# Patient Record
Sex: MALE | Race: WHITE | HISPANIC OR LATINO | Employment: FULL TIME | URBAN - METROPOLITAN AREA
[De-identification: names, ages, dates, MRNs, and addresses within clinical notes are randomized per-mention and may not be internally consistent; named-entity substitution may affect disease eponyms.]

---

## 2018-11-07 ENCOUNTER — OFFICE VISIT (OUTPATIENT)
Dept: URGENT CARE | Facility: CLINIC | Age: 53
End: 2018-11-07
Payer: MEDICAID

## 2018-11-07 VITALS
TEMPERATURE: 98 F | BODY MASS INDEX: 29.82 KG/M2 | HEIGHT: 67 IN | OXYGEN SATURATION: 98 % | HEART RATE: 74 BPM | WEIGHT: 190 LBS

## 2018-11-07 DIAGNOSIS — S05.01XA INJURY OF CONJUNCTIVA AND CORNEAL ABRASION WITHOUT FOREIGN BODY, RIGHT EYE, INITIAL ENCOUNTER: Primary | ICD-10-CM

## 2018-11-07 PROCEDURE — 99203 OFFICE O/P NEW LOW 30 MIN: CPT | Mod: S$GLB,,, | Performed by: NURSE PRACTITIONER

## 2018-11-07 RX ORDER — NEOMYCIN SULFATE, POLYMYXIN B SULFATE, AND DEXAMETHASONE 3.5; 10000; 1 MG/G; [USP'U]/G; MG/G
OINTMENT OPHTHALMIC 3 TIMES DAILY
Qty: 1 TUBE | Refills: 0 | Status: SHIPPED | OUTPATIENT
Start: 2018-11-07

## 2018-11-07 NOTE — PATIENT INSTRUCTIONS
Corneal Abrasion    You have received a scratch or scrape (abrasion) to your cornea. The cornea is the clear part in the front of the eye. This sensitive area is very painful when injured. You may make tears frequently, and your vision may be blurry until the injury heals. You may be sensitive to light.  This part of the body heals quickly. You can expect the pain to go away within 24 to 48 hours. If the abrasion is large or deep, your doctor may apply an eye patch, although this is not always done. An antibiotic ointment or eye drops may also be used to prevent infection.  Numbing drops may be used to relieve the pain temporarily so that your eyes can be examined. However, these drops cannot be prescribed for home use because that would prevent healing and lead to more serious problems. Also, if you cant feel your eye, there is a chance of accidentally injuring it further without knowing it.  Home care  · A cold pack (ice in a plastic bag, wrapped in a towel) may be applied over the eye (or eye patch) for 20 minutes at a time, to reduce pain.  · You may use acetaminophen or ibuprofen to control pain, unless another pain medicine was prescribed. Note: If you have chronic liver or kidney disease or ever had a stomach ulcer or GI bleeding, talk with your doctor before using these medicines.  · Rest your eyes and do not read until symptoms are gone.  · If you use contact lenses, do not wear them until all symptoms are gone.  · If your vision is affected by the corneal abrasion or if an eye patch was applied, do not drive a motor vehicle or operate machinery until all symptoms are gone. You may have trouble judging distances using only one eye.  · If your eyes are sensitive to light, try wearing sunglasses, or stay indoors until symptoms go away.  Follow-up care  Follow up with your health care provider, or as advised.  · If no patch was put on your eye, and used but the pain continues for more than 48 hours, you  should have another exam. Return to this facility or contact your health care provider to arrange this.  · If your eye was patched and you were asked to remove the patch yourself, see your health care provider. You may also return to this facility if you still have pain after the patch is removed.  · If you were given a return appointment for patch removal and re-examination, be sure to keep the appointment. Leaving the patch in place longer than advised could be harmful.  When to seek medical advice  Call your health care provider right away if any of these occur.  · Increasing eye pain or pain that does not improve after 24 hours  · Discharge from the eye  · Increasing redness of the eye or swelling of the eyelids  · Worsening vision  · Symptoms that worsen after the abrasion has healed  Date Last Reviewed: 6/14/2015  © 2449-7654 [employer]. [address] [place] [address]. All rights reserved. This information is not intended as a substitute for professional medical care. Always follow your healthcare professional's instructions.    Please return here or go to the Emergency Department for any concerns or worsening of condition.  If you were prescribed antibiotics, please take them to completion.  If you were prescribed a narcotic medication, do not drive or operate heavy equipment or machinery while taking these medications.  Please follow up with your primary care doctor or specialist as needed.    If you  smoke, please stop smoking.

## 2018-11-07 NOTE — PROGRESS NOTES
"Subjective:       Patient ID: Killian Vergara is a 53 y.o. female.    Vitals:  height is 5' 7" (1.702 m) and weight is 86.2 kg (190 lb). Her temperature is 98.2 °F (36.8 °C). Her pulse is 74. Her oxygen saturation is 98%.     Chief Complaint: Eye Problem    Pt reports for 6 days having both eyes  itchy, watery , has been using otc eye drops      Eye Problem    Both eyes are affected.This is a new problem. The current episode started in the past 7 days. The problem occurs constantly. The injury mechanism is unknown. There is no known exposure to pink eye. She does not wear contacts. Associated symptoms include blurred vision and eye redness. Pertinent negatives include no fever, nausea, photophobia or vomiting. She has tried eye drops for the symptoms. The treatment provided no relief.     Review of Systems   Constitution: Negative for chills and fever.   HENT: Negative for congestion.    Eyes: Positive for blurred vision, pain and redness. Negative for photophobia.   Gastrointestinal: Negative for nausea and vomiting.   Neurological: Negative for headaches.       Objective:      Physical Exam   Constitutional: She is oriented to person, place, and time. She appears well-developed and well-nourished. She is cooperative.  Non-toxic appearance. She does not appear ill. No distress.   HENT:   Head: Normocephalic and atraumatic.   Right Ear: Hearing, tympanic membrane, external ear and ear canal normal.   Left Ear: Hearing, tympanic membrane, external ear and ear canal normal.   Nose: Nose normal. No mucosal edema, rhinorrhea or nasal deformity. No epistaxis. Right sinus exhibits no maxillary sinus tenderness and no frontal sinus tenderness. Left sinus exhibits no maxillary sinus tenderness and no frontal sinus tenderness.   Mouth/Throat: Uvula is midline, oropharynx is clear and moist and mucous membranes are normal. No trismus in the jaw. Normal dentition. No uvula swelling. No posterior oropharyngeal erythema.   Eyes: " Conjunctivae and lids are normal. Right eye exhibits no discharge. Left eye exhibits no discharge. No scleral icterus.   Slit lamp exam:       The right eye shows corneal abrasion and fluorescein uptake.   Sclera red on right side. Abrasion noted to the right eye using woods lamp.    Neck: Trachea normal, normal range of motion, full passive range of motion without pain and phonation normal. Neck supple.   Cardiovascular: Normal rate, regular rhythm, normal heart sounds, intact distal pulses and normal pulses.   Pulmonary/Chest: Effort normal and breath sounds normal. No respiratory distress.   Abdominal: Soft. Normal appearance and bowel sounds are normal. She exhibits no distension, no pulsatile midline mass and no mass. There is no tenderness.   Musculoskeletal: Normal range of motion. She exhibits no edema or deformity.   Neurological: She is alert and oriented to person, place, and time. She exhibits normal muscle tone. Coordination normal.   Skin: Skin is warm, dry and intact. She is not diaphoretic. No pallor.   Psychiatric: She has a normal mood and affect. Her speech is normal and behavior is normal. Judgment and thought content normal. Cognition and memory are normal.   Nursing note and vitals reviewed.      Assessment:       1. Injury of conjunctiva and corneal abrasion without foreign body, right eye, initial encounter        Plan:       Patient Instructions     Corneal Abrasion    You have received a scratch or scrape (abrasion) to your cornea. The cornea is the clear part in the front of the eye. This sensitive area is very painful when injured. You may make tears frequently, and your vision may be blurry until the injury heals. You may be sensitive to light.  This part of the body heals quickly. You can expect the pain to go away within 24 to 48 hours. If the abrasion is large or deep, your doctor may apply an eye patch, although this is not always done. An antibiotic ointment or eye drops may also be  used to prevent infection.  Numbing drops may be used to relieve the pain temporarily so that your eyes can be examined. However, these drops cannot be prescribed for home use because that would prevent healing and lead to more serious problems. Also, if you cant feel your eye, there is a chance of accidentally injuring it further without knowing it.  Home care  · A cold pack (ice in a plastic bag, wrapped in a towel) may be applied over the eye (or eye patch) for 20 minutes at a time, to reduce pain.  · You may use acetaminophen or ibuprofen to control pain, unless another pain medicine was prescribed. Note: If you have chronic liver or kidney disease or ever had a stomach ulcer or GI bleeding, talk with your doctor before using these medicines.  · Rest your eyes and do not read until symptoms are gone.  · If you use contact lenses, do not wear them until all symptoms are gone.  · If your vision is affected by the corneal abrasion or if an eye patch was applied, do not drive a motor vehicle or operate machinery until all symptoms are gone. You may have trouble judging distances using only one eye.  · If your eyes are sensitive to light, try wearing sunglasses, or stay indoors until symptoms go away.  Follow-up care  Follow up with your health care provider, or as advised.  · If no patch was put on your eye, and used but the pain continues for more than 48 hours, you should have another exam. Return to this facility or contact your health care provider to arrange this.  · If your eye was patched and you were asked to remove the patch yourself, see your health care provider. You may also return to this facility if you still have pain after the patch is removed.  · If you were given a return appointment for patch removal and re-examination, be sure to keep the appointment. Leaving the patch in place longer than advised could be harmful.  When to seek medical advice  Call your health care provider right away if any of  these occur.  · Increasing eye pain or pain that does not improve after 24 hours  · Discharge from the eye  · Increasing redness of the eye or swelling of the eyelids  · Worsening vision  · Symptoms that worsen after the abrasion has healed  Date Last Reviewed: 6/14/2015  © 7261-7407 [employer]. [address] [place] [address]. All rights reserved. This information is not intended as a substitute for professional medical care. Always follow your healthcare professional's instructions.    Please return here or go to the Emergency Department for any concerns or worsening of condition.  If you were prescribed antibiotics, please take them to completion.  If you were prescribed a narcotic medication, do not drive or operate heavy equipment or machinery while taking these medications.  Please follow up with your primary care doctor or specialist as needed.    If you  smoke, please stop smoking.        Injury of conjunctiva and corneal abrasion without foreign body, right eye, initial encounter  -     neomycin-polymyxin-dexamethasone (DEXACINE) 3.5 mg/g-10,000 unit/g-0.1 % Oint; Place into the right eye 3 (three) times daily.  Dispense: 1 Tube; Refill: 0     Patient educated if his eye does not get better within the next 24 or 48 hr to follow up with Ophthalmology.

## 2019-03-07 ENCOUNTER — OFFICE VISIT (OUTPATIENT)
Dept: URGENT CARE | Facility: CLINIC | Age: 54
End: 2019-03-07
Payer: MEDICAID

## 2019-03-07 VITALS
WEIGHT: 190 LBS | OXYGEN SATURATION: 98 % | HEIGHT: 67 IN | SYSTOLIC BLOOD PRESSURE: 140 MMHG | HEART RATE: 80 BPM | BODY MASS INDEX: 29.82 KG/M2 | TEMPERATURE: 98 F | DIASTOLIC BLOOD PRESSURE: 90 MMHG

## 2019-03-07 DIAGNOSIS — R10.12 LEFT UPPER QUADRANT PAIN: Primary | ICD-10-CM

## 2019-03-07 PROCEDURE — 74019 RADEX ABDOMEN 2 VIEWS: CPT | Mod: S$GLB,,, | Performed by: RADIOLOGY

## 2019-03-07 PROCEDURE — 99214 OFFICE O/P EST MOD 30 MIN: CPT | Mod: S$GLB,,, | Performed by: NURSE PRACTITIONER

## 2019-03-07 PROCEDURE — 74019 XR ABDOMEN FLAT AND ERECT: ICD-10-PCS | Mod: S$GLB,,, | Performed by: RADIOLOGY

## 2019-03-07 PROCEDURE — 99214 PR OFFICE/OUTPT VISIT, EST, LEVL IV, 30-39 MIN: ICD-10-PCS | Mod: S$GLB,,, | Performed by: NURSE PRACTITIONER

## 2019-03-07 NOTE — PROGRESS NOTES
"Subjective:       Patient ID: Killian Vergara is a 53 y.o. male.    Vitals:  height is 5' 7" (1.702 m) and weight is 86.2 kg (190 lb). His temperature is 98.3 °F (36.8 °C). His blood pressure is 140/90 (abnormal) and his pulse is 80. His oxygen saturation is 98%.     Chief Complaint: Abdominal Pain    Pt reports having abdominal bloating and distention with pain, he states he has been having abdominal pain and an increase In passing gas  for 3 days. Last bowel movement was yesterday, patient reports that it was regular.         Abdominal Pain   This is a new problem. The current episode started in the past 7 days. The onset quality is sudden. The problem occurs constantly. The pain is at a severity of 9/10. The pain is mild. The abdominal pain does not radiate. Pertinent negatives include no constipation, diarrhea, dysuria, fever, nausea or vomiting. She has tried nothing for the symptoms. There is no history of abdominal surgery.       Constitution: Negative for appetite change, chills, sweating and fever.   HENT: Negative for trouble swallowing.    Cardiovascular: Negative for chest pain.   Respiratory: Negative for shortness of breath.    Gastrointestinal: Positive for abdominal pain and abdominal bloating. Negative for abdominal trauma, history of abdominal surgery, nausea, vomiting, constipation, diarrhea, dark colored stools and heartburn.   Genitourinary: Negative for dysuria, missed menses and pelvic pain.   Musculoskeletal: Negative for back pain.       Objective:      Physical Exam   Constitutional: She is oriented to person, place, and time. She appears well-developed and well-nourished. She is cooperative.  Non-toxic appearance. She does not appear ill. No distress.   HENT:   Head: Normocephalic and atraumatic.   Right Ear: Hearing, tympanic membrane, external ear and ear canal normal.   Left Ear: Hearing, tympanic membrane, external ear and ear canal normal.   Nose: Nose normal. No mucosal edema, " rhinorrhea or nasal deformity. No epistaxis. Right sinus exhibits no maxillary sinus tenderness and no frontal sinus tenderness. Left sinus exhibits no maxillary sinus tenderness and no frontal sinus tenderness.   Mouth/Throat: Uvula is midline, oropharynx is clear and moist and mucous membranes are normal. No trismus in the jaw. Normal dentition. No uvula swelling. No posterior oropharyngeal erythema.   Eyes: Conjunctivae and lids are normal. Right eye exhibits no discharge. Left eye exhibits no discharge. No scleral icterus.   Sclera clear bilat   Neck: Trachea normal, normal range of motion, full passive range of motion without pain and phonation normal. Neck supple.   Cardiovascular: Normal rate, regular rhythm, normal heart sounds, intact distal pulses and normal pulses.   Pulmonary/Chest: Effort normal and breath sounds normal. No respiratory distress.   Abdominal: Soft. Normal appearance and bowel sounds are normal. She exhibits no distension, no pulsatile midline mass and no mass. There is tenderness in the left upper quadrant. There is no rigidity, no rebound, no guarding, no CVA tenderness, no tenderness at McBurney's point and negative Hanson's sign.       Musculoskeletal: Normal range of motion. She exhibits no edema or deformity.   Neurological: She is alert and oriented to person, place, and time. She exhibits normal muscle tone. Coordination normal.   Skin: Skin is warm, dry and intact. She is not diaphoretic. No pallor.   Psychiatric: She has a normal mood and affect. Her speech is normal and behavior is normal. Judgment and thought content normal. Cognition and memory are normal.   Nursing note and vitals reviewed.      X-ray Abdomen Flat And Erect    Result Date: 3/7/2019  EXAMINATION: XR ABDOMEN FLAT AND ERECT CLINICAL HISTORY: Left upper quadrant pain TECHNIQUE: Flat and erect AP views of the abdomen were performed. COMPARISON: None FINDINGS: No significant small bowel dilatation.  Moderate fecal  burden.  Surgical clips right upper quadrant.  No abnormal calcifications.  No free gas.     Suspected constipation. Electronically signed by: Dc Alston MD Date:    03/07/2019 Time:    10:23  Assessment:       1. Left upper quadrant pain        Plan:         Left upper quadrant pain  -     X-Ray Abdomen Flat And Erect; Future; Expected date: 03/07/2019      Patient Instructions     Patient to increase fiber in diet and lower consumption of high fat foods.  Patient to start drinking lots of fluids. Patient can take gas x for gas and miralax as needed for constipation.   If symptoms persist or worsen patient needs to follow up with primary or go to the emergency room for CT scan.    Dolor Abdominal    El dolor abdominal es dolor en el vientre o en la lee del intestino. Todo el bismark tiene juliet tipo de dolor de vez en cuando. En muchos casos el dolor desaparece por sí solo. Lionel en ciertas ocasiones el dolor abdominal puede ser consecuencia de un problema grave, dinah por ejemplo liliya apendicitis. Por lo tanto, es importante saber cuándo se debe obtener ayuda.  Causas del dolor abdominal  El dolor abdominal puede tener muchas causas. Entre las causas más comunes, en los adultos, se encuentran las siguientes:   · Estreñimiento, diarrea o gases  · Reflujo gastroesofágico (desplazamiento del ácido estomacal hacia el esófago, también llamado reflujo ácido o ardor estomacal)  · Úlcera péptica (lesión en el revestimiento interno del estómago o del intestino urbina)  · Inflamación de la vesícula biliar, el hígado o el páncreas  · Cálculos biliares o renales  · Apendicitis  · Obstrucción del intestino  · Hernia (protrusión o abultamiento de un órgano interno a través de un músculo u otro tejido)  · Infecciones de las vías urinarias  · En las mujeres, cólicos menstruales, fibromas o endometriosis del útero  · Inflamación o infección del intestino  Diagnóstico de la causa del dolor abdominal  Celaya proveedor de atención  médica le hará un examen para ayudar a determinar la causa de albarran dolor. En seda necesario, le harán ciertas pruebas. El dolor abdominal puede ser difícil de diagnosticar porque jarrett causas pueden ser muy diversas. Los detalles que usted sepa kimberlyn acerca de albarran dolor pueden resultar útiles. Diga a albarran proveedor de atención médica dónde y cuándo siente el dolor y qué cosas lo alivian o lo empeoran. Mencione también si tiene otros síntomas dinah fiebre, cansancio, náuseas, vómito o cambios en la frecuencia con que evacúa jarrett intestinos o albarran vejiga.  Tratamiento del dolor abdominal  Ciertas causas de juliet dolor, dinah liliya apendicitis o liliya obstrucción intestinal, requieren tratamiento urgente. Otros problemas pueden tratarse mediante descanso, consumo de líquidos o medicamentos. Albarran proveedor de atención médica le dará instrucciones específicas para el tratamiento que debe seguir o cómo debe cuidarse según la causa de albarran dolor.   Si tiene vómito o diarrea, vannessa pequeños sorbos de agua u otros líquidos danni. Cuando esté listo para comer de nuevo alimentos sólidos, empiece comiendo pequeñas cantidades de cosas fáciles de digerir, dinah puré de manzanas, pan nacho o galletas saladas.   Cuándo debe llamar al médico  Llame al 911 o vaya al hospital de inmediato si tiene alguno de los siguientes síntomas:  · No puede defecar y está vomitando  · Está vomitando chaparro o tiene diarrea de color negruzco o muy oscuro  · Tiene también dolor en el pecho, en el ted o en el hombro  · Siente que está a punto de desmayarse  · Tiene dolor en los omóplatos, con náuseas  · Tiene un dolor repentino e insoportable en el abdomen  · Tiene un nuevo dolor distinto de todos los ingrid que ha tenido antes  · Tiene el vientre rígido, yaritza y sensible al tacto  Llame a albarran médico si tiene:  · Dolor paul más de 5 días  · Abotargamiento (sensación de llenura e inflamiento) paul más de 2 días  · Diarrea paul más de 5 días  · Fiebre superior  a 101°F o más  · Dolor que continúa aumentando  · Pérdida de peso sin motivo aparente  · Falta de apetito persistente  · Chaparro en las heces  Cómo prevenir el dolor abdominal  Estos son algunos consejos para ayudar a prevenir el dolor abdominal:  · Coma cantidades más pequeñas de alimentos en cada comida.  · Evite los alimentos fritos o que contengan mucha grasa.  · Evite los alimentos que le producen gas.  · Jaya ejercicio con regularidad.  · Marianne abundantes líquidos.  Para ayudar a prevenir los síntomas del reflujo gastroesofágico:  · Deje de fumar.  · Marianne menos alcohol y coma menos de esos alimentos que aumentan albarran acidez estomacal.  · Pierda el exceso de peso.  · Termine de comer dinah mínimo 2 horas antes de acostarse.  · Eleve un poco la cabecera de albarran cama.  Date Last Reviewed: 3/30/2014  © 0614-4632 [employer]. [address] [place] [address]. Todos los derechos reservados. Esta información no pretende sustituir la atención médica profesional. Sólo albarran médico puede diagnosticar y tratar un problema de elicia.        Dieta Karine En Fibra [High Fiber Diet]  La fibra esta presente en todas las frutas, verduras, cereales y granos. Es la porción de los alimentos que pasa por el cuerpo sin ser digerido. Gita dieta karine en fibra ayuda a que los alimentos pasen por las vías intestinales. El volumen adicional le ayudará a evitar estreñimiento y los casos de diverticulosis, limpiando las bolsas en la pared del colon y evitando que otras se lianet. Gita dieta karine en fibra también reduce el riesgo del cáncer del colon, reduce el colesterol en la chaparro y alise el azúcar alto de la chaparro en diabéticos después de comer.    Los alimentos en la lista que sigue son altos en fibra y deben ser incluidos en albarran dieta. Si usted no esta acostumbrado a los alimentos altos en fibra, comienza con 1 o 2 de los alimentos en esta lista. De cada 3 a 4 días, añade un alimento nuevo a albarran dieta hasta que usted está  comiendo 4 alimentos altos en fibra al día. Galion le dará de 20 a 35 gramos de fibra al día. También es importante que, mientras esté usando esta dieta, konstantin bastante agua (de 6 a 8 vasos por día). El agua hace que la fibra se hincha y aumenta el beneficio.  Alimentos Altos En Fibra Dietética:  PAN: Hechos con harina 100% de beverley entero, galletas de James, de beverley o luna, tortillas, panes de salvado (afrecho).  CEREALES: Granos enteros con salvado (Chex, Raisin Bran, Corn Bran), liliane entera, liliane machucada, granola, hojuelas de beverley, arroz no procesado.  NUECES: Cualquier clase de nueces.  FRUTAS: Todas las frutas frescas con jarrett cáscaras comestibles (que se pueden comer) (bananos, frutas cítricas, mangos, peras, ciruelas, pasas, manzanas, amor, albaricoques, melón, jaleas y mermeladas) jugo de frutas [especialmente jugo de ciruela (Prune)].  VERDURAS: Todas clase de verduras, aún mejor si están crudas o poco cocidas [especialmente apio, berenjena, baldomero, espinacas, bróculi, coles de Bruselas, calabaza del invierno, zanahorias, coliflor, soya, lentejas, fríjoles secos y frescos de toda clase].  OTROS: Palomitas de maíz, cualquier condimento.  Si Usted Tiene Diverticulosis, Sandee Estos Alimentos:  Semillas pequeñas pueden ser atrapadas en las bolsas del colon y causar un ataque de diverticulitis. Entonces debe evitar los condimentos que tienen semillas pequeñas (eneldo, apio), frutas con semillas pequeñas (fresas, arándano, frambuesas, zarzamoras, pasas, uvas con semillas), las palomitas de maíz. Si puede evitar tragar las semillas, puede comer frutas con semillas más grandes (dinah la sandía y frutas cítricas).  Date Last Reviewed: 11/19/2013  © 9310-4952 "[employer] [address] [place] [address]. Todos los derechos reservados. Esta información no pretende sustituir la atención médica profesional. Sólo albarran médico puede diagnosticar y tratar un problema de elicia.

## 2019-03-07 NOTE — PATIENT INSTRUCTIONS
Patient to increase fiber in diet and lower consumption of high fat foods.  Patient to start drinking lots of fluids. Patient can take gas x for gas and miralax as needed for constipation.   If symptoms persist or worsen patient needs to follow up with primary or go to the emergency room for CT scan.    Dolor Abdominal    El dolor abdominal es dolor en el vientre o en la lee del intestino. Todo el bismark tiene juliet tipo de dolor de vez en cuando. En muchos casos el dolor desaparece por sí solo. Lionel en ciertas ocasiones el dolor abdominal puede ser consecuencia de un problema grave, dinah por ejemplo liliya apendicitis. Por lo tanto, es importante saber cuándo se debe obtener ayuda.  Causas del dolor abdominal  El dolor abdominal puede tener muchas causas. Entre las causas más comunes, en los adultos, se encuentran las siguientes:   · Estreñimiento, diarrea o gases  · Reflujo gastroesofágico (desplazamiento del ácido estomacal hacia el esófago, también llamado reflujo ácido o ardor estomacal)  · Úlcera péptica (lesión en el revestimiento interno del estómago o del intestino urbina)  · Inflamación de la vesícula biliar, el hígado o el páncreas  · Cálculos biliares o renales  · Apendicitis  · Obstrucción del intestino  · Hernia (protrusión o abultamiento de un órgano interno a través de un músculo u otro tejido)  · Infecciones de las vías urinarias  · En las mujeres, cólicos menstruales, fibromas o endometriosis del útero  · Inflamación o infección del intestino  Diagnóstico de la causa del dolor abdominal  Albarran proveedor de atención médica le hará un examen para ayudar a determinar la causa de albarran dolor. En seda necesario, le harán ciertas pruebas. El dolor abdominal puede ser difícil de diagnosticar porque jarrett causas pueden ser muy diversas. Los detalles que usted sepa kimberlyn acerca de albarran dolor pueden resultar útiles. Diga a albarran proveedor de atención médica dónde y cuándo siente el dolor y qué cosas lo alivian o lo  empeoran. Mencione también si tiene otros síntomas dinah fiebre, cansancio, náuseas, vómito o cambios en la frecuencia con que evacúa jarrett intestinos o albarran vejiga.  Tratamiento del dolor abdominal  Ciertas causas de juliet dolor, dinah liliya apendicitis o liliya obstrucción intestinal, requieren tratamiento urgente. Otros problemas pueden tratarse mediante descanso, consumo de líquidos o medicamentos. Albarran proveedor de atención médica le dará instrucciones específicas para el tratamiento que debe seguir o cómo debe cuidarse según la causa de albarran dolor.   Si tiene vómito o diarrea, vannessa pequeños sorbos de agua u otros líquidos danni. Cuando esté listo para comer de nuevo alimentos sólidos, empiece comiendo pequeñas cantidades de cosas fáciles de digerir, dinah puré de manzanas, pan nacho o galletas saladas.   Cuándo debe llamar al médico  Llame al 911 o vaya al [facility] de inmediato si tiene alguno de los siguientes síntomas:  · No puede defecar y está vomitando  · Está vomitando chaparro o tiene diarrea de color negruzco o muy oscuro  · Tiene también dolor en el pecho, en el ted o en el hombro  · Siente que está a punto de desmayarse  · Tiene dolor en los omóplatos, con náuseas  · Tiene un dolor repentino e insoportable en el abdomen  · Tiene un nuevo dolor distinto de todos los ingrid que ha tenido antes  · Tiene el vientre rígido, yaritza y sensible al tacto  Llame a albarran médico si tiene:  · Dolor paul más de 5 días  · Abotargamiento (sensación de llenura e inflamiento) paul más de 2 días  · Diarrea paul más de 5 días  · Fiebre superior a 101°F o más  · Dolor que continúa aumentando  · Pérdida de peso sin motivo aparente  · Falta de apetito persistente  · Chaparro en las heces  Cómo prevenir el dolor abdominal  Estos son algunos consejos para ayudar a prevenir el dolor abdominal:  · Coma cantidades más pequeñas de alimentos en cada comida.  · Evite los alimentos fritos o que contengan mucha grasa.  · Evite los alimentos  que le producen gas.  · Jaya ejercicio con regularidad.  · Marianne abundantes líquidos.  Para ayudar a prevenir los síntomas del reflujo gastroesofágico:  · Deje de fumar.  · Marianne menos alcohol y coma menos de esos alimentos que aumentan albarran acidez estomacal.  · Pierda el exceso de peso.  · Termine de comer dinah mínimo 2 horas antes de acostarse.  · Eleve un poco la cabecera de albarran cama.  Date Last Reviewed: 3/30/2014  © 7521-3104 [employer] [address] [place] [id number]. Todos los derechos reservados. Esta información no pretende sustituir la atención médica profesional. Sólo albararn médico puede diagnosticar y tratar un problema de elicia.        Dieta Karine En Fibra [High Fiber Diet]  La fibra esta presente en todas las frutas, verduras, cereales y granos. Es la porción de los alimentos que pasa por el cuerpo sin ser digerido. Gita dieta karine en fibra ayuda a que los alimentos pasen por las vías intestinales. El volumen adicional le ayudará a evitar estreñimiento y los casos de diverticulosis, limpiando las bolsas en la pared del colon y evitando que otras se lianet. Gita dieta karine en fibra también reduce el riesgo del cáncer del colon, reduce el colesterol en la chaparro y alise el azúcar alto de la chaparro en diabéticos después de comer.    Los alimentos en la lista que sigue son altos en fibra y deben ser incluidos en albarran dieta. Si usted no esta acostumbrado a los alimentos altos en fibra, comienza con 1 o 2 de los alimentos en esta lista. De cada 3 a 4 días, añade un alimento nuevo a albarran dieta hasta que usted está comiendo 4 alimentos altos en fibra al día. [place] le dará de 20 a 35 gramos de fibra al día. También es importante que, mientras esté usando esta dieta, konstantin bastante agua (de 6 a 8 vasos por día). El agua hace que la fibra se hincha y aumenta el beneficio.  Alimentos Altos En Fibra Dietética:  PAN: Hechos con harina 100% de beverley entero, galletas de James, de beverley o luna, tortillas,  panes de salvado (afrecho).  CEREALES: Granos enteros con salvado (Chex, Raisin Bran, Corn Bran), liliane entera, liliane machucada, granola, hojuelas de beverley, arroz no procesado.  NUECES: Cualquier clase de nueces.  FRUTAS: Todas las frutas frescas con jarrett cáscaras comestibles (que se pueden comer) (bananos, frutas cítricas, mangos, peras, ciruelas, pasas, manzanas, amor, albaricoques, melón, jaleas y mermeladas) jugo de frutas [especialmente jugo de ciruela (Prune)].  VERDURAS: Todas clase de verduras, aún mejor si están crudas o poco cocidas [especialmente apio, berenjena, baldomero, espinacas, bróculi, coles de Bruselas, calabaza del invierno, zanahorias, coliflor, soya, lentejas, fríjoles secos y frescos de toda clase].  OTROS: Palomitas de maíz, cualquier condimento.  Si Usted Tiene Diverticulosis, Sandee Estos Alimentos:  Semillas pequeñas pueden ser atrapadas en las bolsas del colon y causar un ataque de diverticulitis. Entonces debe evitar los condimentos que tienen semillas pequeñas (eneldo, apio), frutas con semillas pequeñas (fresas, arándano, frambuesas, zarzamoras, pasas, uvas con semillas), las palomitas de maíz. Si puede evitar tragar las semillas, puede comer frutas con semillas más grandes (dinah la sandía y frutas cítricas).  Date Last Reviewed: 11/19/2013  © 2376-3070 [employer]. [address] [place] [address], [place] [address]. Todos los derechos reservados. Esta información no pretende sustituir la atención médica profesional. Sólo albarran médico puede diagnosticar y tratar un problema de elicia.